# Patient Record
Sex: MALE | Race: WHITE | NOT HISPANIC OR LATINO | Employment: UNEMPLOYED | URBAN - NONMETROPOLITAN AREA
[De-identification: names, ages, dates, MRNs, and addresses within clinical notes are randomized per-mention and may not be internally consistent; named-entity substitution may affect disease eponyms.]

---
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
## 2025-02-07 ENCOUNTER — OFFICE VISIT (OUTPATIENT)
Dept: ENDOCRINOLOGY | Facility: CLINIC | Age: 20
End: 2025-02-07
Payer: COMMERCIAL

## 2025-02-07 VITALS
DIASTOLIC BLOOD PRESSURE: 64 MMHG | HEIGHT: 72 IN | BODY MASS INDEX: 23.98 KG/M2 | WEIGHT: 177 LBS | HEART RATE: 82 BPM | SYSTOLIC BLOOD PRESSURE: 118 MMHG

## 2025-02-07 DIAGNOSIS — R63.8 SALT CRAVING: ICD-10-CM

## 2025-02-07 DIAGNOSIS — N52.9 ERECTILE DYSFUNCTION, UNSPECIFIED ERECTILE DYSFUNCTION TYPE: ICD-10-CM

## 2025-02-07 DIAGNOSIS — R68.89 CHANGE IN WEIGHT: Primary | ICD-10-CM

## 2025-02-07 DIAGNOSIS — R73.03 PREDIABETES: ICD-10-CM

## 2025-02-07 PROCEDURE — 99204 OFFICE O/P NEW MOD 45 MIN: CPT | Performed by: INTERNAL MEDICINE

## 2025-02-07 RX ORDER — SERTRALINE HYDROCHLORIDE 100 MG/1
1 TABLET, FILM COATED ORAL DAILY
COMMUNITY
Start: 2024-11-26

## 2025-02-07 RX ORDER — ERGOCALCIFEROL 1.25 MG/1
1 CAPSULE, LIQUID FILLED ORAL WEEKLY
COMMUNITY
Start: 2025-01-27

## 2025-02-07 RX ORDER — HYDROXYZINE HYDROCHLORIDE 25 MG/1
1 TABLET, FILM COATED ORAL DAILY
COMMUNITY
Start: 2025-01-13

## 2025-02-07 NOTE — PROGRESS NOTES
Chief Complaint   Patient presents with    Thyroid Problem        New patient who is being seen in consultation regarding thyroid disorder, concern for Easton's disease at the request of Aggie Delvalle NP     HPI   Ty Serrano is a 19 y.o. male who presents for evaluation of possible thyroid disorder, Noe's disease.    Patient reports that around age 16 he gained a significant amount of weight and developed prediabetes.  He did later made changes to diet and exercise and lost a significant amount of weight.  He reports that since weight loss he has had several concerning symptomatic changes.  He reports these symptoms include fatigue, fluctuation in body weight, erectile dysfunction, salt craving.  He does report that his PCP obtained labs but ultimately said he needed further evaluation with endocrinology.  He does report that he was told testosterone was lower than expected on testing several years ago.  He reports fluctuation of weight in the past 6 months, no significant med change.  He denies known history of autoimmune disease in the family.      Past Medical History:   Diagnosis Date    Allergic     Asthma      Past Surgical History:   Procedure Laterality Date    TONSILLECTOMY        Family History   Problem Relation Age of Onset    Diabetes Mother     Hypertension Mother     Osteoarthritis Mother     Osteoarthritis Father     Rheum arthritis Father     Rheum arthritis Maternal Grandmother     Diabetes Maternal Grandmother     Rheum arthritis Maternal Grandfather     Diabetes Maternal Grandfather       Social History     Socioeconomic History    Marital status: Single   Tobacco Use    Smoking status: Never    Smokeless tobacco: Never   Vaping Use    Vaping status: Never Used   Substance and Sexual Activity    Alcohol use: Never    Drug use: Never    Sexual activity: Defer      No Known Allergies   Current Outpatient Medications on File Prior to Visit   Medication Sig Dispense Refill    Diclofenac Sodium  (VOLTAREN) 1 % gel gel       dicyclomine (BENTYL) 10 MG capsule Take 1 capsule by mouth Every 12 (Twelve) Hours.      Dulera 200-5 MCG/ACT inhaler inhale 2 puffs by mouth twice daily in the morning and in the evening      hydrOXYzine (ATARAX) 25 MG tablet Take 1 tablet by mouth Daily.      hydrOXYzine pamoate (VISTARIL) 25 MG capsule Take 1 capsule by mouth every night at bedtime.      ibuprofen (ADVIL,MOTRIN) 400 MG tablet Take 1 tablet by mouth 3 (Three) Times a Day As Needed for Mild Pain , Moderate Pain  or Headache. 30 tablet 0    ipratropium-albuterol (DUO-NEB) 0.5-2.5 mg/3 ml nebulizer Take 3 mL by nebulization Every 4 (Four) Hours As Needed for Wheezing or Shortness of Air. 150 mL 0    montelukast (SINGULAIR) 10 MG tablet Take 1 tablet by mouth Every Evening.      ondansetron (Zofran) 4 MG tablet Take 1 to 2 tablets every 6-8 hours for nausea if needed. 20 tablet 0    sertraline (ZOLOFT) 100 MG tablet Take 1 tablet by mouth Daily.      vitamin D (ERGOCALCIFEROL) 1.25 MG (72034 UT) capsule capsule Take 1 capsule by mouth 1 (One) Time Per Week.      [DISCONTINUED] loratadine (CLARITIN) 10 MG tablet Take  by mouth.      [DISCONTINUED] FLUoxetine (PROzac) 10 MG capsule TAKE 1 CAPSULE BY MOUTH DAILY 30 capsule 0    [DISCONTINUED] omega-3 acid ethyl esters (LOVAZA) 1 g capsule TAKE 1 CAPSULE BY MOUTH TWICE DAILY FOR CHOLESTEROL OR TRIGLYCERIDES (Patient not taking: Reported on 2/7/2025)       No current facility-administered medications on file prior to visit.        Review of Systems   Constitutional:  Positive for activity change, appetite change, fatigue, unexpected weight gain and unexpected weight loss.   Cardiovascular:  Positive for palpitations.   Gastrointestinal:  Positive for constipation and diarrhea.   Endocrine: Positive for cold intolerance, heat intolerance and polydipsia.   Genitourinary:  Positive for urgency.   Neurological:  Positive for dizziness, light-headedness and headache.  "  Psychiatric/Behavioral:  Positive for decreased concentration. The patient is nervous/anxious.         Vitals:    02/07/25 1414   BP: 118/64   Pulse: 82   Weight: 80.3 kg (177 lb)   Height: 182.9 cm (72\")   Body mass index is 24.01 kg/m².     Physical Exam  Vitals reviewed.   Constitutional:       General: He is not in acute distress.  HENT:      Head: Normocephalic and atraumatic.   Neck:      Thyroid: No thyromegaly.   Cardiovascular:      Rate and Rhythm: Normal rate and regular rhythm.   Pulmonary:      Effort: Pulmonary effort is normal.      Breath sounds: Normal breath sounds.   Neurological:      General: No focal deficit present.      Mental Status: He is alert.   Psychiatric:         Mood and Affect: Mood and affect normal.         Behavior: Behavior is cooperative.          Labs/Imaging    Labs completed 10/31/2024  Cortisol drawn at 12:30 PM 9.1    Labs dated 2/7/2024  TSH 1.57  Sodium 142  Potassium 4.2      Assessment and Plan    Diagnoses and all orders for this visit:    1. Change in weight (Primary)  -     TSH; Future  -     T4, Free; Future    2. Salt craving  -     ACTH; Future  -     Cortisol - AM; Future  -     Basic Metabolic Panel; Future    3. Erectile dysfunction, unspecified erectile dysfunction type  -     Testosterone, F Eqlib & T LC / MS; Future    4. Prediabetes  -     Hemoglobin A1c; Future    Discussed with patient that his symptomatic concerns are nonspecific.  I did review available prior labs with patient.  Patient states primary concern was for either Cameron's disease or thyroid dysfunction.  We reviewed that his prior available testing is not suggestive of this.  He had normal thyroid function testing in February 2024.  We discussed that cortisol value completed in October 2024 is not low for the time of day which measurement was obtained.  I did recommend repeat screening for thyroid dysfunction as well as 8 AM measurement of ACTH and cortisol.  Will also check BMP for " evaluation of electrolytes.  I discussed with patient that prior measurement of testosterone is not available.  I recommended repeat evaluation at 8 AM.  We will also plan to check A1c given patient report of prediabetes on prior laboratory evaluation.    Determine next steps after review of labs.  If above evaluation is normal, patient will need to follow-up with his PCP for evaluation of alternative causes.  Patient voiced understanding.      Follow-up will be determined after review of labs.  The patient was instructed to contact the clinic with any interval questions or concerns.    Electronically signed by: Gabriela Nation MD     Dictated Utilizing Dragon Dictation

## 2025-02-10 ENCOUNTER — LAB (OUTPATIENT)
Dept: LAB | Facility: HOSPITAL | Age: 20
End: 2025-02-10
Payer: COMMERCIAL

## 2025-02-10 DIAGNOSIS — R63.8 SALT CRAVING: ICD-10-CM

## 2025-02-10 DIAGNOSIS — N52.9 ERECTILE DYSFUNCTION, UNSPECIFIED ERECTILE DYSFUNCTION TYPE: ICD-10-CM

## 2025-02-10 DIAGNOSIS — R68.89 CHANGE IN WEIGHT: ICD-10-CM

## 2025-02-10 DIAGNOSIS — R73.03 PREDIABETES: ICD-10-CM

## 2025-02-10 LAB
ANION GAP SERPL CALCULATED.3IONS-SCNC: 9.4 MMOL/L (ref 5–15)
BUN SERPL-MCNC: 15 MG/DL (ref 6–20)
BUN/CREAT SERPL: 16.3 (ref 7–25)
CALCIUM SPEC-SCNC: 10.1 MG/DL (ref 8.6–10.5)
CHLORIDE SERPL-SCNC: 103 MMOL/L (ref 98–107)
CO2 SERPL-SCNC: 27.6 MMOL/L (ref 22–29)
CORTIS AM PEAK SERPL-MCNC: 2.76 MCG/DL
CREAT SERPL-MCNC: 0.92 MG/DL (ref 0.76–1.27)
EGFRCR SERPLBLD CKD-EPI 2021: 122.9 ML/MIN/1.73
GLUCOSE SERPL-MCNC: 81 MG/DL (ref 65–99)
HBA1C MFR BLD: 4.6 % (ref 4.8–5.6)
POTASSIUM SERPL-SCNC: 4 MMOL/L (ref 3.5–5.2)
SODIUM SERPL-SCNC: 140 MMOL/L (ref 136–145)
T4 FREE SERPL-MCNC: 1.32 NG/DL (ref 0.92–1.68)
TSH SERPL DL<=0.05 MIU/L-ACNC: 2.08 UIU/ML (ref 0.27–4.2)

## 2025-02-10 PROCEDURE — 80048 BASIC METABOLIC PNL TOTAL CA: CPT

## 2025-02-10 PROCEDURE — 84402 ASSAY OF FREE TESTOSTERONE: CPT

## 2025-02-10 PROCEDURE — 82024 ASSAY OF ACTH: CPT

## 2025-02-10 PROCEDURE — 83036 HEMOGLOBIN GLYCOSYLATED A1C: CPT

## 2025-02-10 PROCEDURE — 84443 ASSAY THYROID STIM HORMONE: CPT

## 2025-02-10 PROCEDURE — 84403 ASSAY OF TOTAL TESTOSTERONE: CPT

## 2025-02-10 PROCEDURE — 82533 TOTAL CORTISOL: CPT

## 2025-02-10 PROCEDURE — 84439 ASSAY OF FREE THYROXINE: CPT

## 2025-02-11 LAB — ACTH PLAS-MCNC: 14.5 PG/ML (ref 7.2–63.3)

## 2025-02-17 LAB
TESTOST FREE MFR SERPL: 2.53 % (ref 1.5–4.2)
TESTOST FREE SERPL-MCNC: 15.19 NG/DL (ref 5–21)
TESTOST SERPL-MCNC: 600.3 NG/DL (ref 264–916)

## 2025-02-21 ENCOUNTER — LAB (OUTPATIENT)
Dept: ENDOCRINOLOGY | Facility: CLINIC | Age: 20
End: 2025-02-21
Payer: COMMERCIAL

## 2025-02-21 DIAGNOSIS — R79.89 ABNORMAL CORTISOL LEVEL: ICD-10-CM

## 2025-02-21 LAB
CORTIS SERPL-MCNC: 13.89 MCG/DL
CORTIS SERPL-MCNC: 16.53 MCG/DL
CORTIS SERPL-MCNC: 3.03 MCG/DL

## 2025-02-21 PROCEDURE — 82533 TOTAL CORTISOL: CPT | Performed by: INTERNAL MEDICINE

## 2025-02-21 PROCEDURE — 36415 COLL VENOUS BLD VENIPUNCTURE: CPT | Performed by: INTERNAL MEDICINE

## 2025-04-11 ENCOUNTER — OFFICE VISIT (OUTPATIENT)
Dept: ENDOCRINOLOGY | Facility: CLINIC | Age: 20
End: 2025-04-11
Payer: COMMERCIAL

## 2025-04-11 VITALS
HEART RATE: 82 BPM | BODY MASS INDEX: 24.38 KG/M2 | WEIGHT: 180 LBS | OXYGEN SATURATION: 100 % | HEIGHT: 72 IN | DIASTOLIC BLOOD PRESSURE: 62 MMHG | SYSTOLIC BLOOD PRESSURE: 118 MMHG

## 2025-04-11 DIAGNOSIS — R53.83 OTHER FATIGUE: ICD-10-CM

## 2025-04-11 DIAGNOSIS — R73.03 PREDIABETES: ICD-10-CM

## 2025-04-11 DIAGNOSIS — R79.89 ABNORMAL CORTISOL LEVEL: Primary | ICD-10-CM

## 2025-04-11 LAB
ANION GAP SERPL CALCULATED.3IONS-SCNC: 9.7 MMOL/L (ref 5–15)
BUN SERPL-MCNC: 16 MG/DL (ref 6–20)
BUN/CREAT SERPL: 16.2 (ref 7–25)
CALCIUM SPEC-SCNC: 9.9 MG/DL (ref 8.6–10.5)
CHLORIDE SERPL-SCNC: 100 MMOL/L (ref 98–107)
CO2 SERPL-SCNC: 28.3 MMOL/L (ref 22–29)
CREAT SERPL-MCNC: 0.99 MG/DL (ref 0.76–1.27)
EGFRCR SERPLBLD CKD-EPI 2021: 111.8 ML/MIN/1.73
EXPIRATION DATE: NORMAL
GLUCOSE BLDC GLUCOMTR-MCNC: 114 MG/DL (ref 70–130)
GLUCOSE SERPL-MCNC: 88 MG/DL (ref 65–99)
Lab: NORMAL
POTASSIUM SERPL-SCNC: 3.6 MMOL/L (ref 3.5–5.2)
PROLACTIN SERPL-MCNC: 37.6 NG/ML (ref 4.04–15.2)
SODIUM SERPL-SCNC: 138 MMOL/L (ref 136–145)

## 2025-04-11 PROCEDURE — 82024 ASSAY OF ACTH: CPT | Performed by: INTERNAL MEDICINE

## 2025-04-11 PROCEDURE — 83516 IMMUNOASSAY NONANTIBODY: CPT | Performed by: INTERNAL MEDICINE

## 2025-04-11 PROCEDURE — 84146 ASSAY OF PROLACTIN: CPT | Performed by: INTERNAL MEDICINE

## 2025-04-11 PROCEDURE — 80048 BASIC METABOLIC PNL TOTAL CA: CPT | Performed by: INTERNAL MEDICINE

## 2025-04-11 RX ORDER — HYDROCORTISONE 10 MG/1
10 TABLET ORAL DAILY
COMMUNITY
Start: 2025-03-27

## 2025-04-11 NOTE — PROGRESS NOTES
"Chief Complaint   Patient presents with    Abnormal Lab        HPI   Ty Serrano is a 20 y.o. male had concerns including Abnormal Lab.      Patient reports that his PCP started him on hydrocortisone 10 mg once daily in the interim since last visit.  He reports energy is somewhat improved but fatigue and other symptoms remain ongoing.  He remains concerned for [place]'s disease.      The following portions of the patient's history were reviewed and updated as appropriate: allergies, current medications, and past social history.    Review of Systems   Constitutional:  Positive for fatigue.      /62 (BP Location: Left arm, Patient Position: Sitting, Cuff Size: Large Adult)   Pulse 82   Ht 182.9 cm (72\")   Wt 81.6 kg (180 lb)   SpO2 100%   BMI 24.41 kg/m²      Physical Exam      Constitutional:  well developed; well nourished  no acute distress  appears stated age   ENT/Thyroid: not examined   Eyes: Conjunctiva: clear   Respiratory:  breathing is unlabored  clear to auscultation bilaterally   Cardiovascular:  regular rate and rhythm   Chest:  Not performed.   Abdomen: Not performed.   : Not performed.   Musculoskeletal: Not performed   Skin: not performed.   Neuro: mental status, speech normal   Psych: mood and affect are within normal limits       Labs/Imaging   Latest Reference Range & Units 02/10/25 07:57 02/21/25 09:01 02/21/25 09:39 02/21/25 10:16 04/11/25 08:15   Sodium 136 - 145 mmol/L 140       Potassium 3.5 - 5.2 mmol/L 4.0       Chloride 98 - 107 mmol/L 103       CO2 22.0 - 29.0 mmol/L 27.6       Anion Gap 5.0 - 15.0 mmol/L 9.4       BUN 6 - 20 mg/dL 15       Creatinine 0.76 - 1.27 mg/dL 0.92       BUN/Creatinine Ratio 7.0 - 25.0  16.3       eGFR >60.0 mL/min/1.73 122.9       Glucose 70 - 130 mg/dL 81    114   Calcium 8.6 - 10.5 mg/dL 10.1       ACTH 7.2 - 63.3 pg/mL 14.5       Cortisol mcg/dL  3.03 13.89 16.53    Cortisol - AM mcg/dL 2.76       Hemoglobin A1C 4.80 - 5.60 % 4.60 (L)     "   Testosterone, Total 264.0 - 916.0 ng/dL 600.3       Testosterone, Free 5.00 - 21.00 ng/dL 15.19       Testosterone, Free % 1.50 - 4.20 % 2.53       TSH Baseline 0.270 - 4.200 uIU/mL 2.080       Free T4 0.92 - 1.68 ng/dL 1.32       (L): Data is abnormally low    Labs completed 10/31/2024  Cortisol drawn at 12:30 PM 9.1     Labs dated 2/7/2024  TSH 1.57  Sodium 142  Potassium 4.2      Diagnoses and all orders for this visit:    1. Abnormal cortisol level (Primary)  -     Adrenal 21-Hydroxylase Autoantibodies; Future  -     Basic Metabolic Panel; Future  -     ACTH; Future  -     Prolactin; Future  Prior testing was reviewed with the patient.  We did discuss that timing of an individuals peak cortisol value is somewhat dependent on circadian rhythm and thus 8 AM is not fully representative in all patients.  We discussed that prior random value of 9.1 which was drawn at 12:30 PM suggests peak  cortisol for him maybe at a different time.  He did have a rise in cortisol with ACTH stimulation to 16.53.  This is an appropriate stimulation for the assay used at our facility.  Based on available data, I would not recommend treatment with hydrocortisone at this time.  We discussed potential development of iatrogenic adrenal insufficiency via suppression of the HPA axis if this is used long-term.  We also discussed long-term complications of exogenous steroids including worsening glycemic control.  Plan to check adrenal autoantibodies given patient concern for [place]'s.  We discussed the presence of antibodies is not diagnostic and that his ACTH is not consistent with this diagnosis as it would be expected to be elevated.  Plan to check prolactin as marker of pituitary function no other testing (thyroid function, testosterone) would indicate normal function.  Determine next EpiCept review of labs    2. Prediabetes  -     POC Glucose, Blood  Per patient report, abnormal A1c not available.  A1c 4.6 at last visit    3.  Other fatigue  Reviewed that this is a nonspecific symptom with multiple potential etiologies.  Recommend ongoing evaluation with primary care for other potential etiologies       Return in about 4 months (around 8/11/2025). The patient was instructed to contact the clinic with any interval questions or concerns.    Electronically signed by: Gabriela Nation MD   Endocrinologist    Dictated Utilizing Dragon Dictation

## 2025-04-12 LAB — ACTH PLAS-MCNC: 8.9 PG/ML (ref 7.2–63.3)

## 2025-04-14 ENCOUNTER — TELEPHONE (OUTPATIENT)
Dept: ENDOCRINOLOGY | Facility: CLINIC | Age: 20
End: 2025-04-14
Payer: COMMERCIAL

## 2025-04-15 NOTE — TELEPHONE ENCOUNTER
Left message to return call.  
PT WOULD LIKE FOR DR GOMES TO ORDER AN MRI WITH [facility]. HE WOULD ALSO LIKE TO HAVE THE RESULTS OF THE ACTH BLOOD WORK. PT REQUESTED A CALL BACK.    THANK YOU!  
Please contact patient.  If repeat prolactin is elevated, we will proceed with MRI.  I will notify him once result is available.  
Pt notified and verbalized understanding.  
See message  
Luis F

## 2025-04-17 ENCOUNTER — LAB (OUTPATIENT)
Dept: LAB | Facility: HOSPITAL | Age: 20
End: 2025-04-17
Payer: COMMERCIAL

## 2025-04-17 DIAGNOSIS — R79.89 ELEVATED PROLACTIN LEVEL: ICD-10-CM

## 2025-04-17 LAB — PROLACTIN SERPL-MCNC: 8.65 NG/ML (ref 4.04–15.2)

## 2025-04-17 PROCEDURE — 84146 ASSAY OF PROLACTIN: CPT | Performed by: INTERNAL MEDICINE

## 2025-04-22 ENCOUNTER — LAB (OUTPATIENT)
Dept: LAB | Facility: HOSPITAL | Age: 20
End: 2025-04-22
Payer: COMMERCIAL

## 2025-04-22 DIAGNOSIS — R79.89 ELEVATED PROLACTIN LEVEL: ICD-10-CM

## 2025-04-22 PROCEDURE — 84146 ASSAY OF PROLACTIN: CPT

## 2025-04-23 LAB — PROLACTIN SERPL-MCNC: 8.35 NG/ML (ref 4.04–15.2)

## 2025-04-26 LAB — 21HYDROXYLASE AB SER QL: NEGATIVE

## 2025-08-05 ENCOUNTER — TELEPHONE (OUTPATIENT)
Dept: FAMILY MEDICINE CLINIC | Facility: CLINIC | Age: 20
End: 2025-08-05
Payer: COMMERCIAL